# Patient Record
Sex: MALE | Race: WHITE | NOT HISPANIC OR LATINO | Employment: FULL TIME | URBAN - METROPOLITAN AREA
[De-identification: names, ages, dates, MRNs, and addresses within clinical notes are randomized per-mention and may not be internally consistent; named-entity substitution may affect disease eponyms.]

---

## 2024-11-04 ENCOUNTER — HOSPITAL ENCOUNTER (OUTPATIENT)
Facility: HOSPITAL | Age: 29
Discharge: HOME | End: 2024-11-04
Attending: EMERGENCY MEDICINE | Admitting: INTERNAL MEDICINE
Payer: COMMERCIAL

## 2024-11-04 VITALS
HEART RATE: 60 BPM | TEMPERATURE: 98 F | HEIGHT: 73 IN | DIASTOLIC BLOOD PRESSURE: 75 MMHG | RESPIRATION RATE: 20 BRPM | OXYGEN SATURATION: 96 % | WEIGHT: 230 LBS | BODY MASS INDEX: 30.48 KG/M2 | SYSTOLIC BLOOD PRESSURE: 126 MMHG

## 2024-11-04 DIAGNOSIS — Z77.29 EXPOSURE TO METHAMPHETAMINE: ICD-10-CM

## 2024-11-04 DIAGNOSIS — R07.9 CHEST PAIN: ICD-10-CM

## 2024-11-04 DIAGNOSIS — R94.31 EKG ABNORMALITIES: ICD-10-CM

## 2024-11-04 DIAGNOSIS — R00.1 JUNCTIONAL BRADYCARDIA: ICD-10-CM

## 2024-11-04 DIAGNOSIS — R42 DIZZINESS: ICD-10-CM

## 2024-11-04 DIAGNOSIS — R55 NEAR SYNCOPE: Primary | ICD-10-CM

## 2024-11-04 DIAGNOSIS — I49.8 JUNCTIONAL CARDIAC ARRHYTHMIA: ICD-10-CM

## 2024-11-04 LAB
ALBUMIN SERPL-MCNC: 4.4 G/DL (ref 3.5–5)
ALBUMIN/GLOB SERPL: 1.2 RATIO (ref 1.1–2)
ALP SERPL-CCNC: 75 UNIT/L (ref 40–150)
ALT SERPL-CCNC: 32 UNIT/L (ref 0–55)
AMPHET UR QL SCN: NEGATIVE
ANION GAP SERPL CALC-SCNC: 12 MEQ/L
AST SERPL-CCNC: 21 UNIT/L (ref 5–34)
BARBITURATE SCN PRESENT UR: NEGATIVE
BASOPHILS # BLD AUTO: 0.11 X10(3)/MCL
BASOPHILS NFR BLD AUTO: 1.2 %
BENZODIAZ UR QL SCN: NEGATIVE
BILIRUB SERPL-MCNC: 0.4 MG/DL
BUN SERPL-MCNC: 10.2 MG/DL (ref 8.9–20.6)
CALCIUM SERPL-MCNC: 9.4 MG/DL (ref 8.4–10.2)
CANNABINOIDS UR QL SCN: NEGATIVE
CHLORIDE SERPL-SCNC: 108 MMOL/L (ref 98–107)
CO2 SERPL-SCNC: 22 MMOL/L (ref 22–29)
COCAINE UR QL SCN: NEGATIVE
CREAT SERPL-MCNC: 1.15 MG/DL (ref 0.72–1.25)
CREAT/UREA NIT SERPL: 9
EOSINOPHIL # BLD AUTO: 0.58 X10(3)/MCL (ref 0–0.9)
EOSINOPHIL NFR BLD AUTO: 6.3 %
ERYTHROCYTE [DISTWIDTH] IN BLOOD BY AUTOMATED COUNT: 11.8 % (ref 11.5–17)
ETHANOL SERPL-MCNC: <10 MG/DL
FENTANYL UR QL SCN: NEGATIVE
GFR SERPLBLD CREATININE-BSD FMLA CKD-EPI: >60 ML/MIN/1.73/M2
GLOBULIN SER-MCNC: 3.6 GM/DL (ref 2.4–3.5)
GLUCOSE SERPL-MCNC: 96 MG/DL (ref 74–100)
HCT VFR BLD AUTO: 44.7 % (ref 42–52)
HGB BLD-MCNC: 15.1 G/DL (ref 14–18)
IMM GRANULOCYTES # BLD AUTO: 0.02 X10(3)/MCL (ref 0–0.04)
IMM GRANULOCYTES NFR BLD AUTO: 0.2 %
LYMPHOCYTES # BLD AUTO: 4.04 X10(3)/MCL (ref 0.6–4.6)
LYMPHOCYTES NFR BLD AUTO: 43.7 %
MAGNESIUM SERPL-MCNC: 2 MG/DL (ref 1.6–2.6)
MCH RBC QN AUTO: 32.3 PG (ref 27–31)
MCHC RBC AUTO-ENTMCNC: 33.8 G/DL (ref 33–36)
MCV RBC AUTO: 95.7 FL (ref 80–94)
MDMA UR QL SCN: NEGATIVE
MONOCYTES # BLD AUTO: 0.92 X10(3)/MCL (ref 0.1–1.3)
MONOCYTES NFR BLD AUTO: 9.9 %
NEUTROPHILS # BLD AUTO: 3.58 X10(3)/MCL (ref 2.1–9.2)
NEUTROPHILS NFR BLD AUTO: 38.7 %
NRBC BLD AUTO-RTO: 0 %
OPIATES UR QL SCN: NEGATIVE
PCP UR QL: NEGATIVE
PH UR: 6 [PH] (ref 3–11)
PLATELET # BLD AUTO: 309 X10(3)/MCL (ref 130–400)
PMV BLD AUTO: 11 FL (ref 7.4–10.4)
POTASSIUM SERPL-SCNC: 3.6 MMOL/L (ref 3.5–5.1)
PROT SERPL-MCNC: 8 GM/DL (ref 6.4–8.3)
RBC # BLD AUTO: 4.67 X10(6)/MCL (ref 4.7–6.1)
SODIUM SERPL-SCNC: 142 MMOL/L (ref 136–145)
SPECIFIC GRAVITY, URINE AUTO (.000) (OHS): 1.01 (ref 1–1.03)
TROPONIN I SERPL-MCNC: 0.01 NG/ML (ref 0–0.04)
TSH SERPL-ACNC: 2.95 UIU/ML (ref 0.35–4.94)
WBC # BLD AUTO: 9.25 X10(3)/MCL (ref 4.5–11.5)

## 2024-11-04 PROCEDURE — 96374 THER/PROPH/DIAG INJ IV PUSH: CPT

## 2024-11-04 PROCEDURE — 93005 ELECTROCARDIOGRAM TRACING: CPT

## 2024-11-04 PROCEDURE — 85025 COMPLETE CBC W/AUTO DIFF WBC: CPT | Performed by: EMERGENCY MEDICINE

## 2024-11-04 PROCEDURE — 93010 ELECTROCARDIOGRAM REPORT: CPT | Mod: ,,, | Performed by: INTERNAL MEDICINE

## 2024-11-04 PROCEDURE — G0378 HOSPITAL OBSERVATION PER HR: HCPCS

## 2024-11-04 PROCEDURE — 84443 ASSAY THYROID STIM HORMONE: CPT | Performed by: NURSE PRACTITIONER

## 2024-11-04 PROCEDURE — 84484 ASSAY OF TROPONIN QUANT: CPT | Performed by: EMERGENCY MEDICINE

## 2024-11-04 PROCEDURE — 82077 ASSAY SPEC XCP UR&BREATH IA: CPT | Performed by: STUDENT IN AN ORGANIZED HEALTH CARE EDUCATION/TRAINING PROGRAM

## 2024-11-04 PROCEDURE — 99291 CRITICAL CARE FIRST HOUR: CPT

## 2024-11-04 PROCEDURE — 63600175 PHARM REV CODE 636 W HCPCS: Performed by: EMERGENCY MEDICINE

## 2024-11-04 PROCEDURE — 80307 DRUG TEST PRSMV CHEM ANLYZR: CPT | Performed by: EMERGENCY MEDICINE

## 2024-11-04 PROCEDURE — 83735 ASSAY OF MAGNESIUM: CPT | Performed by: EMERGENCY MEDICINE

## 2024-11-04 PROCEDURE — 80053 COMPREHEN METABOLIC PANEL: CPT | Performed by: EMERGENCY MEDICINE

## 2024-11-04 RX ORDER — IBUPROFEN 200 MG
24 TABLET ORAL
Status: DISCONTINUED | OUTPATIENT
Start: 2024-11-04 | End: 2024-11-04 | Stop reason: HOSPADM

## 2024-11-04 RX ORDER — PROCHLORPERAZINE EDISYLATE 5 MG/ML
5 INJECTION INTRAMUSCULAR; INTRAVENOUS EVERY 6 HOURS PRN
Status: DISCONTINUED | OUTPATIENT
Start: 2024-11-04 | End: 2024-11-04 | Stop reason: HOSPADM

## 2024-11-04 RX ORDER — GLUCAGON 1 MG
1 KIT INJECTION
Status: DISCONTINUED | OUTPATIENT
Start: 2024-11-04 | End: 2024-11-04 | Stop reason: HOSPADM

## 2024-11-04 RX ORDER — ALUMINUM HYDROXIDE, MAGNESIUM HYDROXIDE, AND SIMETHICONE 1200; 120; 1200 MG/30ML; MG/30ML; MG/30ML
30 SUSPENSION ORAL 4 TIMES DAILY PRN
Status: DISCONTINUED | OUTPATIENT
Start: 2024-11-04 | End: 2024-11-04 | Stop reason: HOSPADM

## 2024-11-04 RX ORDER — NALOXONE HCL 0.4 MG/ML
0.02 VIAL (ML) INJECTION
Status: DISCONTINUED | OUTPATIENT
Start: 2024-11-04 | End: 2024-11-04 | Stop reason: HOSPADM

## 2024-11-04 RX ORDER — TALC
6 POWDER (GRAM) TOPICAL NIGHTLY PRN
Status: DISCONTINUED | OUTPATIENT
Start: 2024-11-04 | End: 2024-11-04 | Stop reason: HOSPADM

## 2024-11-04 RX ORDER — IBUPROFEN 200 MG
16 TABLET ORAL
Status: DISCONTINUED | OUTPATIENT
Start: 2024-11-04 | End: 2024-11-04 | Stop reason: HOSPADM

## 2024-11-04 RX ORDER — ATROPINE SULFATE 0.1 MG/ML
INJECTION INTRAVENOUS CODE/TRAUMA/SEDATION MEDICATION
Status: DISCONTINUED | OUTPATIENT
Start: 2024-11-04 | End: 2024-11-04 | Stop reason: HOSPADM

## 2024-11-04 RX ORDER — BISACODYL 10 MG/1
10 SUPPOSITORY RECTAL DAILY PRN
Status: DISCONTINUED | OUTPATIENT
Start: 2024-11-04 | End: 2024-11-04 | Stop reason: HOSPADM

## 2024-11-04 RX ORDER — ONDANSETRON HYDROCHLORIDE 2 MG/ML
4 INJECTION, SOLUTION INTRAVENOUS EVERY 4 HOURS PRN
Status: DISCONTINUED | OUTPATIENT
Start: 2024-11-04 | End: 2024-11-04 | Stop reason: HOSPADM

## 2024-11-04 RX ORDER — SODIUM CHLORIDE 0.9 % (FLUSH) 0.9 %
10 SYRINGE (ML) INJECTION
Status: DISCONTINUED | OUTPATIENT
Start: 2024-11-04 | End: 2024-11-04 | Stop reason: HOSPADM

## 2024-11-04 RX ORDER — AMOXICILLIN 250 MG
1 CAPSULE ORAL 2 TIMES DAILY PRN
Status: DISCONTINUED | OUTPATIENT
Start: 2024-11-04 | End: 2024-11-04 | Stop reason: HOSPADM

## 2024-11-04 RX ORDER — ACETAMINOPHEN 500 MG
1000 TABLET ORAL EVERY 6 HOURS PRN
Status: DISCONTINUED | OUTPATIENT
Start: 2024-11-04 | End: 2024-11-04 | Stop reason: HOSPADM

## 2024-11-04 RX ADMIN — ATROPINE SULFATE 0.1 MG: 0.1 INJECTION INTRAVENOUS at 03:11

## 2024-11-04 NOTE — DISCHARGE SUMMARY
[facility] Hospital Medicine Discharge Summary    Admit Date: 11/4/2024  Discharge Date and Time: 11/4/20242:41 PM  Admitting Physician: JUMA Team  Discharging Physician: Samuel Mccormick MD.  Primary Care Physician: Vane, Primary Doctor  Consults: {consultation: [id number]}    Discharge Diagnoses:  ***    Hospital Course:   ***  Pt was seen and examined on the day of discharge  Vitals:  VITAL SIGNS: 24 HRS MIN & MAX LAST   Temp  Min: 98 °F (36.7 °C)  Max: 98 °F (36.7 °C) 98 °F (36.7 °C)   BP  Min: 71/38  Max: 147/83 121/74   Pulse  Min: 35  Max: 110  (!) 56   Resp  Min: 10  Max: 25 18   SpO2  Min: 92 %  Max: 100 % 96 %       Physical Exam:  ***    Procedures Performed: No admission procedures for hospital encounter.     Significant Diagnostic Studies: See Full reports for all details    Recent Labs   Lab 11/04/24  0350   WBC 9.25   RBC 4.67*   HGB 15.1   HCT 44.7   MCV 95.7*   MCH 32.3*   MCHC 33.8   RDW 11.8      MPV 11.0*       Recent Labs   Lab 11/04/24  0350      K 3.6   *   CO2 22   BUN 10.2   CREATININE 1.15   CALCIUM 9.4   MG 2.00   ALBUMIN 4.4   ALKPHOS 75   ALT 32   AST 21   BILITOT 0.4        Microbiology Results (last 7 days)       ** No results found for the last 168 hours. **             X-Ray Wrist Complete Left  EXAMINATION  XR Wrist Left Minimum 3 Views     INDICATION  Trauma     Comparison: There are no comparisons.     FINDINGS  No displaced fracture or dislocation is identified. The visualized  joint spaces are preserved and there are no findings indicative of a  joint effusion. No aggressive osseous lesion or periosteal reaction is  identified.     The included soft tissues are without acute abnormality.     IMPRESSION  No evidence of acute or focal abnormality.         Electronically Signed By: Silvestre Boudreaux MD  Date/Time Signed: 05/18/2019 17:01         Medication List      You have not been prescribed any medications.          Explained in  detail to the patient about the discharge plan, medications, and follow-up visits. Pt understands and agrees with the treatment plan  Discharge Disposition:     Discharged Condition: stable  Diet-   Dietary Orders (From admission, onward)       Start     Ordered    11/04/24 0448  Diet Adult Regular  Diet effective now         11/04/24 0447                   Medications Per DC med rec  Activities as tolerated   Follow-up Information       No, Primary Doctor. Schedule an appointment as soon as possible for a visit.                           For further questions contact hospitalist office    Discharge time 33 minutes    For worsening symptoms, chest pain, shortness of breath, increased abdominal pain, high grade fever, stroke or stroke like symptoms, immediately go to the nearest Emergency Room or call 911 as soon as possible.      Samuel Zamora M.D, on 11/4/2024. at 2:41 PM.

## 2024-11-04 NOTE — Clinical Note
Diagnosis: Near syncope [[id number]]   Future Attending Provider: HANNAH RAMIREZ [[id number]]   Admit to which facility:: [facility] [[id number]]

## 2024-11-04 NOTE — H&P
[facility]  Emergency [facility] MEDICINE - H&P ADMISSION NOTE    Patient Name: Nate Tadeo  MRN: [id number]  Patient Class: OP- Observation   Admission Date: 11/4/2024   Admitting Physician: JUMA Service   Attending Physician: Thairy G Reyes, DO  Primary Care Provider: Vane, Primary Doctor  Face-to-Face encounter date: 11/04/2024      CHIEF COMPLAINT     Chief Complaint   Patient presents with    Exposure      exposed to fentanyl and methamphetamines by touching the crystals. States he started to feel lightheaded and dizzy and was given IN Narcan.      HISTORY OF PRESENTING ILLNESS   28-year-old male  with no past medical history presents with complaint of fentanyl/meth exposure.  Patient reports he was searching a detained individuals pockets when he noticed he was touching meth crystals.  He received Narcan x1 at that time despite being conscious however he reports feeling lightheaded and diaphoretic.  In the ED heart rate dropped as low as 20 per physician report with hypotension and a map less than 65.  Patient being admitted for observation given cardiac findings and cis was consulted from the ED. at the time of my evaluation patient is asymptomatic and hemodynamically stable.  PAST MEDICAL HISTORY   No past medical history on file.    PAST SURGICAL HISTORY   No past surgical history on file.    FAMILY HISTORY   Reviewed and noncontributory to this case    SOCIAL HISTORY     Social History     Socioeconomic History    Marital status:        HOME MEDICATIONS     Prior to Admission medications    Not on File       ALLERGIES   Patient has no known allergies.  REVIEW OF SYSTEMS   Except as documented above, all other systems reviewed and negative    PHYSICAL EXAM     Vitals:    11/04/24 0424   BP: 137/89   Pulse: 89   Resp: 20   Temp:       General:  In no acute distress, resting comfortably  Head and neck:  Atraumatic, normocephalic, moist mucous  membranes, supple neck  Chest:  Clear to auscultation bilaterally  Heart:  S1, S2, no appreciable murmur  Abdomen:  Soft, nontender, BS +  MSK:  Warm, no lower extremity edema, no clubbing or cyanosis  Neuro:  Alert and oriented x4, moving all extremities with good strength  Integumentary:  No obvious skin rash  Psychiatry:  Appropriate mood and affect  ASSESSMENT AND PLAN   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 No needs identified  __________________________________________________________________  LABS/MICRO/MEDS/DIAGNOSTICS       LABS  Recent Labs     11/04/24  0350      K 3.6   CO2 22   BUN 10.2   CREATININE 1.15   GLUCOSE 96   CALCIUM 9.4   ALKPHOS 75   AST 21   ALT 32   ALBUMIN 4.4     Recent Labs     11/04/24  0350   WBC 9.25   RBC 4.67*   HCT 44.7   MCV 95.7*          MICROBIOLOGY  Microbiology Results (last 7 days)       ** No results found for the last 168 hours. **            MEDICATIONS     INFUSIONS      DIAGNOSTIC TESTS  No orders to display          Patient information was obtained from patient, patient's family, past medical records and ER records.   All diagnosis and differential diagnosis have been reviewed; assessment and plan has been documented. I have personally reviewed the labs and test results that are presently available; I have reviewed the patients medication list. I have reviewed the consulting providers response and  recommendations. I have reviewed or attempted to review medical records based upon their availability.  All of the patient's questions have been addressed and answered. Patient's is agreeable to the above stated plan. I will continue to monitor closely and make adjustments to medical management as needed.  This note was created using [employer] voice recognition software that occasionally misinterpreted phrases or words.  Please contact me if any questions may rise regarding documentation to clarify verbiage.        Thairy G Reyes, DO   Internal Medicine  Department of Hospital Medicine  [facility] - Emergency Dept

## 2024-11-04 NOTE — NURSING
Admission documentation completed by the admit nurse. Home med rec complete - pt does not take prescribed meds. Pt did elect for meds to bed with [facility]. 4 Eyes and standing weight to be completed by the admitting floor nurse.

## 2024-11-04 NOTE — ED PROVIDER NOTES
Encounter Date: 11/4/2024       History     Chief Complaint   Patient presents with    Exposure      exposed to fentanyl and methamphetamines by touching the crystals. States he started to feel lightheaded and dizzy and was given IN Narcan.      28-year-old male presents to the emergency department for evaluation after lightheadedness, dizziness, near syncopal episode while at work.  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  No witnessed LOC.  No respiratory depression.  Given intranasal Narcan approximately 20 minutes prior to arrival.    The history is provided by the patient and the police.     Review of patient's allergies indicates:  No Known Allergies  No past medical history on file.  No past surgical history on file.  No family history on file.     Review of Systems   Constitutional:  Negative for fever.   Respiratory:  Negative for shortness of breath.    Cardiovascular:  Negative for chest pain.   Gastrointestinal:  Negative for vomiting.   Neurological:  Positive for dizziness and light-headedness.       Physical Exam     Initial Vitals [11/04/24 0324]   BP Pulse Resp Temp SpO2   (!) 146/90 84 16 98 °F (36.7 °C) 99 %      MAP       --         Physical Exam    Nursing note and vitals reviewed.  Constitutional: He appears well-developed and well-nourished. No distress.   HENT:   Head: Normocephalic and atraumatic. Mouth/Throat: Oropharynx is clear and moist.   Eyes: No scleral icterus.   Neck: Neck supple.   Cardiovascular:    Bradycardia present.         Pulmonary/Chest: Breath sounds normal. No respiratory distress.   Abdominal: He exhibits no distension. There is no abdominal tenderness.   Musculoskeletal:      Cervical back: Neck supple.     Skin: Capillary refill takes more than 3 seconds. There is pallor.         ED Course   Procedures  Labs Reviewed   COMPREHENSIVE METABOLIC PANEL - Abnormal       Result  Value    Sodium 142      Potassium 3.6      Chloride 108 (*)     CO2 22      Glucose 96      Blood Urea Nitrogen 10.2      Creatinine 1.15      Calcium 9.4      Protein Total 8.0      Albumin 4.4      Globulin 3.6 (*)     Albumin/Globulin Ratio 1.2      Bilirubin Total 0.4      ALP 75      ALT 32      AST 21      eGFR >60      Anion Gap 12.0      BUN/Creatinine Ratio 9     CBC WITH DIFFERENTIAL - Abnormal    WBC 9.25      RBC 4.67 (*)     Hgb 15.1      Hct 44.7      MCV 95.7 (*)     MCH 32.3 (*)     MCHC 33.8      RDW 11.8      Platelet 309      MPV 11.0 (*)     Neut % 38.7      Lymph % 43.7      Mono % 9.9      Eos % 6.3      Basophil % 1.2      Lymph # 4.04      Neut # 3.58      Mono # 0.92      Eos # 0.58      Baso # 0.11      IG# 0.02      IG% 0.2      NRBC% 0.0     TROPONIN I - Normal    Troponin-I 0.011     MAGNESIUM - Normal    Magnesium Level 2.00     CBC W/ AUTO DIFFERENTIAL    Narrative:     The following orders were created for panel order CBC auto differential.  Procedure                               Abnormality         Status                     ---------                               -----------         ------                     CBC with Differential[[id number]]       Abnormal            Final result                 Please view results for these tests on the individual orders.   DRUG SCREEN, URINE (BEAKER)     EKG Readings: (Independently Interpreted)   Initial Reading: No STEMI. Rhythm: Junctional Rhythm. Heart Rate: 70. Other Findings: Prolonged QT Interval.   11/04/2024 @ 0336   Other EKG Interpretations: Repeat EKG 11/04/2024 at 3:40 a.m.: Normal sinus rhythm 99 beats per minute, no ST elevation or depression       Imaging Results    None          Medications   atropine injection (0.1 mg Intravenous Given 11/4/24 0335)     Medical Decision Making  Problems Addressed:  Dizziness: acute illness or injury  Exposure to methamphetamine: acute illness or injury  Junctional bradycardia: acute illness or  "injury that poses a threat to life or bodily functions  Near syncope: acute illness or injury    Amount and/or Complexity of Data Reviewed  Labs: ordered.    Risk  Prescription drug management.      ED assessment:    Mr. Tadeo presented with reported near syncopal episode, lightheadedness, dizziness after skin contact with suspected illicit substance.  Given Narcan on scene.  Hemodynamically stable on ED arrival.    Differential diagnosis (including but not limited to):   Accidental drug exposure, near-syncope, palpitations, anxiety, electrolyte derangements, dehydration, orthostasis, vasovagal episode    ED management:   Patient arrives hemodynamically stable; however, following IV start per RN, patient became profoundly pale, severely bradycardic with ventricular rates dropping into the 20s and 30s, hypotensive with blood pressures dropping to the 60s and 70s.  Waited out for several minutes trying to recover as suspected vasovagal episode related to IV stick; however, patient not recovering, becoming more presyncopal.  Atropine 1 mg IV given with resolution of hemodynamic instability.  See scanned rhythm strip above.  Laboratory studies unremarkable with no significant electrolyte derangements, negative cardiac enzymes, negative urine drug screen.  Discussed with the patient, suspect similar near syncopal episode on scene, likely vasovagal response.  Will admit for observation given 2 episodes of near-syncope with confirmed arrhythmic event though anticipate due to high vagal tone.  Discussed with cardiology who will see in consultation.      Amount and/or Complexity of Data Reviewed  Independent historian: law enforcement    Summary of history:  Officer accompanying the patient reports that he had unloved exposure to shards" of suspected methamphetamine, possible fentanyl. C/o lightheadedness, dizziness.  No loss of consciousness.  Intranasal fentanyl given.    External data reviewed: notes from clinic " visits  Summary of data reviewed:  Vital signs from previous ED visits, urgent care visits relate resting heart rate in 60s to 80s.  Risk and benefits of testing: discussed   Labs: ordered and reviewed    ECG/medicine tests: ordered and independent interpretation performed (see above or ED course)  Discussion of management or test interpretation with external provider(s): discussed with hospitalist physician and discussed with cardiology consultant   Summary of discussion:  As below, additionally discussed with hospitalist NP who accepts for observation    Risk  Prescription drug management   Decision regarding hospitalization  Shared decision making     Critical Care  < 30 minutes    Carissa CHANDLER MD personally performed the history, PE, MDM, and procedures as documented above and agree with the scribe's documentation.              ED Course as of 11/04/24 1528   Mon Nov 04, 2024   0436 Discussed with [facility], will see in consultation.  Agreed likely prolonged vagal response.  Will discuss with Hospital Medicine regarding observation on telemetry today to ensure does not have recurrent arrhythmia. [KS]      ED Course User Index  [KS] Carissa Garcia MD                           Clinical Impression:  Final diagnoses:  [R42] Dizziness  [R55] Near syncope (Primary)  [R00.1] Junctional bradycardia  [Z77.29] Exposure to methamphetamine          ED Disposition Condition    Observation Stable                Carissa Garcia MD  11/04/24 9517

## 2024-11-04 NOTE — CONSULTS
"Inpatient consult to Cardiology  Consult performed by: Rebekah Trevino FNP  Consult ordered by: Carissa Garcia MD  Reason for consult: symptomatic bradycardia        [facility] - Emergency Dept    Cardiology  Consult Note    Patient Name: Nate Tadeo  MRN: [id number]  Admission Date: 11/4/2024  Hospital Length of Stay: 0 days  Code Status: Full Code   Attending Provider: Marvel Amato MD   Consulting Provider: ROSANGELA Gonzalez  Primary Care Physician: Vane, Primary Doctor  Principal Problem:Junctional bradycardia    Patient information was obtained from patient, past medical records, and ER records.     Subjective:     Reason for Consult: Junctional Bradycardia     HPI: Mr. Tadeo is a 28 year old male who is unknown to [facility]. He presents to the ER with complaints of a fentanyl/meth exposure. He works as a  and reportedly was searching a detained individual's pocket. He noticed that he was touching "meth crystals". He was given Narcan x 1 at the time, and he reports feeling lightheaded and diaphoretic. While in the ER, his heart rate was noted to drop in the 20's w/ associated hypotension. He denies CP, SOB, or palps. [facility] has been consulted to further evaluate his bradycardic event.       PMH: Denies  PSH: Denies  Family History: Denies  Social History: Reports alcohol use (1/5 of whiskey & a 6 pack 14 days/month). Denies tobacco or illicit drug use.     Previous Cardiac Diagnostics:   None to review.     Review of patient's allergies indicates:  No Known Allergies  No current facility-administered medications on file prior to encounter.     No current outpatient medications on file prior to encounter.       Review of Systems   Respiratory:  Negative for shortness of breath.    Cardiovascular:  Negative for chest pain and palpitations.       Objective:     Vital Signs (Most Recent):  Temp: 98 °F (36.7 °C) (11/04/24 0324)  Pulse: 75 (11/04/24 0704)  Resp: 20 (11/04/24 " "0704)  BP: (!) 127/58 (11/04/24 0704)  SpO2: 97 % (11/04/24 0704) Vital Signs (24h Range):  Temp:  [98 °F (36.7 °C)] 98 °F (36.7 °C)  Pulse:  [] 75  Resp:  [10-25] 20  SpO2:  [92 %-100 %] 97 %  BP: ()/(38-90) 127/58   Weight: 104.3 kg (230 lb)  Body mass index is 30.34 kg/m².  SpO2: 97 %     No intake or output data in the 24 hours ending 11/04/24 0745  Lines/Drains/Airways       Peripheral Intravenous Line  Duration                  Peripheral IV - Single Lumen 11/04/24 0330 18 G Anterior;Distal;Right Upper Arm <1 day                  Significant Labs:   Chemistries:   Recent Labs   Lab 11/04/24  0350      K 3.6   *   CO2 22   BUN 10.2   CREATININE 1.15   CALCIUM 9.4   BILITOT 0.4   ALKPHOS 75   ALT 32   AST 21   GLUCOSE 96   MG 2.00   TROPONINI 0.011        CBC/Anemia Labs: Coags:    Recent Labs   Lab 11/04/24  0350   WBC 9.25   HGB 15.1   HCT 44.7      MCV 95.7*   RDW 11.8    No results for input(s): "PT", "INR", "APTT" in the last 168 hours.     Significant Imaging:  Imaging Results    None       EKG: Unable to Review    Telemetry:  SR    Physical Exam  HENT:      Head: Normocephalic.      Nose: Nose normal.      Mouth/Throat:      Mouth: Mucous membranes are moist.   Eyes:      Extraocular Movements: Extraocular movements intact.   Cardiovascular:      Rate and Rhythm: Normal rate and regular rhythm.      Pulses: Normal pulses.      Heart sounds: Normal heart sounds.   Pulmonary:      Effort: Pulmonary effort is normal.      Breath sounds: Normal breath sounds.   Abdominal:      Palpations: Abdomen is soft.   Musculoskeletal:         General: Normal range of motion.   Skin:     General: Skin is warm and dry.   Neurological:      Mental Status: He is alert and oriented to person, place, and time.   Psychiatric:         Behavior: Behavior normal.       Home Medications:   No current facility-administered medications on file prior to encounter.     No current outpatient medications " on file prior to encounter.         Assessment:   Symptomatic Bradycardia - Resolved (Now SR)  Fentanyl/Methamphetamine Exposure  Alcohol Use    Plan:   Suspect transient junctional bradycardia s/t vasovagal response.  No further cardiac workup needed.   Stable for discharge from cardiac standpoint.       Thank you for your consult.     Rebekah Trevino, ROSANGELA  Cardiology  [facility] - Emergency Dept  11/04/2024     Pt seen and examined by me, Geovany Mitchell MD. I have reviewed the NP's note, assessment and plan. I have personally interviewed and examined the patient at bedside and agree with the findings. Medical decision making listed above were done under my guidance.     Physical exam:  NAD  Cardiovascular system: regular rhythm, no murmur.  Lungs: CTAB.  Abd: S/ST/ND  Extremities: No leg edema.      Assessment and Plan:  Patient seen and examined. No active cardiac complaints.  Reports no exertional symptoms or limitations and no family history premature atherosclerotic disease.  ECG is reviewed with no suspect vasovagal event.  No additional testing warranted  -will be available if needed

## 2024-11-05 ENCOUNTER — PATIENT OUTREACH (OUTPATIENT)
Dept: ADMINISTRATIVE | Facility: CLINIC | Age: 29
End: 2024-11-05
Payer: COMMERCIAL

## 2024-11-05 LAB
OHS QRS DURATION: 90 MS
OHS QTC CALCULATION: 385 MS
OHS QTC CALCULATION: 413 MS
OHS QTC CALCULATION: 469 MS